# Patient Record
Sex: MALE | Race: BLACK OR AFRICAN AMERICAN | NOT HISPANIC OR LATINO | URBAN - METROPOLITAN AREA
[De-identification: names, ages, dates, MRNs, and addresses within clinical notes are randomized per-mention and may not be internally consistent; named-entity substitution may affect disease eponyms.]

---
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
## 2025-07-03 ENCOUNTER — OFFICE (OUTPATIENT)
Facility: LOCATION | Age: 25
Setting detail: OPHTHALMOLOGY
End: 2025-07-03
Payer: COMMERCIAL

## 2025-07-03 DIAGNOSIS — H01.004: ICD-10-CM

## 2025-07-03 DIAGNOSIS — S05.02XA: ICD-10-CM

## 2025-07-03 DIAGNOSIS — H01.005: ICD-10-CM

## 2025-07-03 DIAGNOSIS — H01.002: ICD-10-CM

## 2025-07-03 DIAGNOSIS — H01.001: ICD-10-CM

## 2025-07-03 PROCEDURE — 92002 INTRM OPH EXAM NEW PATIENT: CPT | Performed by: STUDENT IN AN ORGANIZED HEALTH CARE EDUCATION/TRAINING PROGRAM

## 2025-07-03 ASSESSMENT — KERATOMETRY
OS_K2POWER_DIOPTERS: 41.75
OD_AXISANGLE_DEGREES: 097
OD_K1POWER_DIOPTERS: 41.25
OS_K1POWER_DIOPTERS: 41.50
OS_AXISANGLE_DEGREES: 069
OD_K2POWER_DIOPTERS: 41.50

## 2025-07-03 ASSESSMENT — REFRACTION_CURRENTRX
OD_CYLINDER: SPH
OS_OVR_VA: 20/
OD_OVR_VA: 20/
OS_VPRISM_DIRECTION: SV
OD_VPRISM_DIRECTION: SV
OD_SPHERE: -3.25
OS_SPHERE: -3.25
OS_CYLINDER: SPH

## 2025-07-03 ASSESSMENT — CONFRONTATIONAL VISUAL FIELD TEST (CVF)
OS_FINDINGS: FULL
OD_FINDINGS: FULL

## 2025-07-03 ASSESSMENT — REFRACTION_AUTOREFRACTION
OD_AXIS: 070
OS_SPHERE: -2.75
OS_AXIS: 124
OD_CYLINDER: -0.25
OD_SPHERE: -3.75
OS_CYLINDER: -0.25

## 2025-07-03 ASSESSMENT — LID EXAM ASSESSMENTS
OS_BLEPHARITIS: LLL LUL T
OD_BLEPHARITIS: RLL RUL T

## 2025-07-03 ASSESSMENT — VISUAL ACUITY
OS_BCVA: 20/20
OD_BCVA: 20/20

## 2025-07-10 ENCOUNTER — OFFICE (OUTPATIENT)
Facility: LOCATION | Age: 25
Setting detail: OPHTHALMOLOGY
End: 2025-07-10
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
## 2025-07-10 PROCEDURE — NO SHOW FE NO SHOW FEE: Performed by: STUDENT IN AN ORGANIZED HEALTH CARE EDUCATION/TRAINING PROGRAM
